# Patient Record
Sex: FEMALE | Race: WHITE | Employment: OTHER | URBAN - METROPOLITAN AREA
[De-identification: names, ages, dates, MRNs, and addresses within clinical notes are randomized per-mention and may not be internally consistent; named-entity substitution may affect disease eponyms.]

---

## 2023-05-18 ENCOUNTER — HOSPITAL ENCOUNTER (OUTPATIENT)
Dept: PHYSICAL THERAPY | Facility: CLINIC | Age: 73
Setting detail: THERAPIES SERIES
Discharge: HOME | End: 2023-05-18
Payer: MEDICARE

## 2023-05-18 PROCEDURE — 97161 PT EVAL LOW COMPLEX 20 MIN: CPT

## 2023-05-18 PROCEDURE — 95992 CANALITH REPOSITIONING PROC: CPT

## 2023-05-18 PROCEDURE — 97110 THERAPEUTIC EXERCISES: CPT

## 2023-05-18 NOTE — CONSULTS
[facility] &  Therapy  [address]  [phone]  F: [phone] [] [address] [facility] [address]   [address]  P: [phone]  F: [phone] [] [address] &  Therapy  [address]  P: [phone]  F: [phone] [x] [address]  P: [phone]  F: [phone] [] [address]  [facility]   [address]   [place]: [phone]  F: [phone]       Physical Therapy Vestibular Rehab Evaluation    Date:  2023  Patient: Galo Hernandez  : 1950  MRN: [id number]  Physician: Charissa Nova MD   Insurance: Medicare- [facility]  Medical Diagnosis: R42 (ICD-10-CM) - Dizziness and giddiness    Rehab Codes: R42 (ICD-10-CM) - Dizziness and giddiness  H81.10  Onset date: 23; see below  Next Dr's appt.: TBD- pending success with PT services     Subjective:   CC: Began to experience x4-5 years ago- \"out of the blue- but then wouldn't last.\"   X2 years appeared cyclical.   Last Z8.6 years- no episodes. \"3-4 times in the past 5 years. \"  Last episode occurred during fitness routine- rolling to place/pick-up barbell. Reports sig room spinning dizziness with attempting transfer from floor. \"Not right yet- not major- can do laundry, cook etc.\"   Has been unable to participate in fitness for past month and a half. Symptoms also with bending forward. Also experiencing symptoms with leaning, looking to the left. Visited chiro- underwent epley- felt worse. Reports attempting home epley- no relief. Visited ENT- nothing significant. Not currently utilizing Meclizine.      HPI: 23; see above     Comorbidities: NONE  [] Obesity [] Dialysis  [] Other:   [] Asthma/COPD [] Dementia [] Other:   [] Stroke [] Sleep

## 2023-05-18 NOTE — FLOWSHEET NOTE
Adri Fall Risk Assessment    Patient Name:  Chito Manzanares  : 1950    Risk Factor Scale  Score   History of Falls [] Yes  [x] No 25  0 0   Secondary Diagnosis [] Yes  [x] No 15  0 0   Ambulatory Aid [] Furniture  [] Crutches/cane/walker  [x] None/bedrest/wheelchair/nurse 30  15  0 0   IV/Heparin Lock [] Yes  [x] No 20  0 0   Gait/Transferring [] Impaired  [] Weak  [x] Normal/bedrest/immobile 20  10  0 0   Mental Status [] Forgets limitations  [x] Oriented to own ability 15  0 0      Total: 0     Based on the Assessment score: check the appropriate box.     [x]  No intervention needed   Low =   Score of 0-24    []  Use standard prevention interventions Moderate =  Score of 24-44   [] Give patient handout and discuss fall prevention strategies   [] Establish goal of education for patient/family RE: fall prevention strategies    []  Use high risk prevention interventions High = Score of 45 and higher   [] Give patient handout and discuss fall prevention strategies   [] Establish goal of education for patient/family Re: fall prevention strategies   [] Discuss lifeline / other resources    Electronically signed by:   French Morelos PT  Date: 2023

## 2023-05-25 ENCOUNTER — HOSPITAL ENCOUNTER (OUTPATIENT)
Dept: PHYSICAL THERAPY | Facility: CLINIC | Age: 73
Setting detail: THERAPIES SERIES
Discharge: HOME | End: 2023-05-25
Payer: MEDICARE

## 2023-05-25 PROCEDURE — 97530 THERAPEUTIC ACTIVITIES: CPT

## 2023-05-25 PROCEDURE — 97035 APP MDLTY 1+ULTRASOUND EA 15: CPT

## 2023-05-25 PROCEDURE — 97110 THERAPEUTIC EXERCISES: CPT

## 2023-05-25 NOTE — FLOWSHEET NOTE
Verbalizes understanding. [] Demonstrates understanding. [] Needs review. [] Demonstrates/verbalizes HEP/Ed previously given. 5/25/23- See grid above for details. Plan: [x] Continue current frequency toward long and short term goals. [x] Specific Instructions for subsequent treatments: PT services placed on hold for x1 month- may return if symptoms resume or change- otherwise will be discharged.        Time In: 1:01PM            Time Out: 1:25PM    Electronically signed by:  Eva Dougherty PT

## 2023-07-06 ENCOUNTER — CLINICAL DOCUMENTATION (OUTPATIENT)
Dept: PHYSICAL THERAPY | Facility: CLINIC | Age: 73
End: 2023-07-06

## 2023-07-06 NOTE — DISCHARGE SUMMARY
[] [facility] - [facility] TWELVESTEP [facility] &  Therapy  [address]  [phone]  F: [phone] [] [address] [facility]  [address]   [address]  P: [phone]  F: [phone] [] [address] &  Therapy  [address]  P: [phone]  F: [phone] [x] [facility]  P: [phone]  F: [phone] [] [address] [facility]  [address] [facility]   [address]   [place]: [phone]  F: [phone]      Physical Therapy Discharge Note    Date: 2023      Patient: Zelalem Moy  : 1950  MRN: [id number]    Physician: Loki Pierre MD                              Insurance: [place]- [place]  Medical Diagnosis: R42 (ICD-10-CM) - Dizziness and giddiness                 Rehab Codes: R42 (ICD-10-CM) - Dizziness and giddiness  H81.10  Onset date: 23; see below                   Next Dr's appt.: TBD- pending success with PT services   Visit# / total visits: ; Progress note for Medicare patient due at visit 6 [or end of PT POC]                Cancels/No Shows: 0/0  Date of initial visit: 23                Date of final visit: 23    Assessment:  Pt discharged today from PT services secondary to not returning- most likely due to feeling better. No further PT services recommended at this time. See below for goal update. [facility] [place] is 4  [facility] is 100%    Problems:                                                                                     [x]  1. Vertigo- intermittent   []  2. Difficulty walking a straight course                                    [x]  3. Positive Yoana Hallpike                                               []  4.  Static balance deficit: mCTSIB  []  5.   Dynamic balance deficit: Marcus Balance Scale (BBS), Dynamic Gait Index (DGI),

## 2025-05-15 ENCOUNTER — HOSPITAL ENCOUNTER (OUTPATIENT)
Dept: PHYSICAL THERAPY | Age: 75
Setting detail: THERAPIES SERIES
Discharge: HOME | End: 2025-05-15
Payer: MEDICARE

## 2025-05-15 PROCEDURE — 97530 THERAPEUTIC ACTIVITIES: CPT

## 2025-05-15 PROCEDURE — 95992 CANALITH REPOSITIONING PROC: CPT

## 2025-05-15 PROCEDURE — 97161 PT EVAL LOW COMPLEX 20 MIN: CPT

## 2025-05-15 NOTE — CONSULTS
[facility] Outpatient Rehabilitation & Therapy  [address] [place] [address]    P: [phone]  F: [phone]     PHYSICAL THERAPY VESTIBULAR EVALUATION      Date:  5/15/2025  Patient: Faith Mondragon  : 1950  MRN: [id number]  Referring Provider:  Artemio Carney MD  Insurance: [facility] Medicare Advantage no auth, unlimited  (medical necessity)   Medical Diagnosis: R42 Vertigo  Rehab Codes: H81.10, M54.2    Onset date: 25    Next 's appt.: TBD, PRN     Subjective:   HPI: History of vertigo 2 years ago. Had PT with good result after 1 session. Reports the vertigo returned a few months ago. Its annoying to her but not debilitating. Not as intense as a couple years ago, episode. Reports some left side neck pain as well x 1.5 weeks that is not getting better. Reports room spinning dizziness that lasts seconds when laying down and getting up. Has to move slow with change in body positions. Bending over makes her dizzy. She is very active and exercises 6 days a week. Says this is limiting her weight training that requires  her to lay flat. Dizziness is limiting her workouts. Reports 1 fall on 25 - visting her daugther in [place] when she got up in the middle of the night to use bathroom and had a visual aura of yellow Atka and ringing in the ears x 10\" when she got up to leave bathroom she blacked out and fell to the floor. Nothing else came of it, did not see doctor following this.      CC:  vertigo       PMHx:    No past medical history on file.    Meningioma removed in thoracic region 10 years ago at [facility].        Allergies: [] NKA  [x] Refer to intake sheet  Medication: [x] Refer to intake sheet  [] None  Test: [] X-Ray  [] MRI [] CT Scan   [] Other  Comments:     Pain:   Neck pain  0/10 , hurts when presses on it.  [] No c/o pain    Pain altered Tx: [] No  [] Yes Action:  Symptoms: [] Improving  [] Worsening  [] Same  Sleep: [x] OK  [] Disturbed    Fall

## 2025-05-21 ENCOUNTER — HOSPITAL ENCOUNTER (OUTPATIENT)
Dept: PHYSICAL THERAPY | Age: 75
Setting detail: THERAPIES SERIES
Discharge: HOME | End: 2025-05-21
Payer: MEDICARE

## 2025-05-21 PROCEDURE — 97530 THERAPEUTIC ACTIVITIES: CPT

## 2025-05-21 PROCEDURE — 97110 THERAPEUTIC EXERCISES: CPT

## 2025-05-21 PROCEDURE — 97140 MANUAL THERAPY 1/> REGIONS: CPT

## 2025-05-21 NOTE — FLOWSHEET NOTE
[facility]   Outpatient Rehabilitation & Therapy  [address] [facility] [address]  P: [phone]   F: [phone]    Physical Therapy Daily Treatment Note      Date:  2025  Patient Name:  Faith Mondragon    :  1950  MRN: [id number]  Referring Provider:   Artemio Carney MD    Insurance: [facility] Medicare Advantage no auth, unlimited  (medical necessity)   Medical Diagnosis: R42 Vertigo  Rehab Codes: H81.10, M54.2    Onset date: 25                   Next 's appt.: TBD, PRN   Visit# / total visits: 2/8  Cancels/No Shows: 0/0    Subjective:  arrives stating she has felt very \"crappy\" since last session. States that she has had a lot of pain in her neck/base of skull. Has not had the dizziness like she did before but has not done much to test it.   Pain:  [x] Yes  [] No Location:  neck/ base of skull Pain Rating: (0-10 scale) 2/10  Pain altered Tx:  [] No  [] Yes  Action:  Comments:    Objective:      Precautions:  thoracic sx- meningioma removal 10 years ago.   Exercises:INSTRUCTION IN HOME EXERCISE PROGRAM  Exercise Reps/ Time Weight/ Level Comments    right Dixhallpike - negative          Left dixhallpike- negative          Right/left roll test- negative                   Supine chin tuck  X10 5\" hold        Cervical rotation stretch with towel 5x each way, 5\" Hold        Cervical extension with towel X 5 5\" hold      Other:  Manual: SOR, TPR to spenius capitis muscles. Upper cervical rotation mobilization, x 15' total.     Access Code: [email or web address]  URL: [email or web address]  Date: 2025  Prepared by: Manjit Frank    Exercises  - Supine Chin Tuck  - 3 x daily - 7 x weekly - 1 sets - 10 reps - 5\" hold  - Seated Assisted Cervical Rotation with Towel  - 3 x daily - 7 x weekly - 1 sets - 5 reps - 5\" hold  - Upper Cervical Extension SNAG with Strap  - 3 x daily - 7 x weekly - 1 sets - 10 reps - 5\" hold       Specific Instructions for next treatment: focus on upper cervical spine.

## 2025-05-22 ENCOUNTER — APPOINTMENT (OUTPATIENT)
Dept: PHYSICAL THERAPY | Age: 75
End: 2025-05-22
Payer: MEDICARE

## 2025-05-28 ENCOUNTER — APPOINTMENT (OUTPATIENT)
Dept: PHYSICAL THERAPY | Age: 75
End: 2025-05-28
Payer: MEDICARE

## 2025-05-30 ENCOUNTER — HOSPITAL ENCOUNTER (OUTPATIENT)
Dept: PHYSICAL THERAPY | Age: 75
Setting detail: THERAPIES SERIES
Discharge: HOME | End: 2025-05-30
Payer: MEDICARE

## 2025-05-30 PROCEDURE — 97140 MANUAL THERAPY 1/> REGIONS: CPT

## 2025-05-30 PROCEDURE — 97110 THERAPEUTIC EXERCISES: CPT

## 2025-05-30 NOTE — FLOWSHEET NOTE
[facility]   Outpatient Rehabilitation & Therapy  [address]  P: [phone]   F: [phone]    Physical Therapy Daily Treatment Note      Date:  2025  Patient Name:  Faith Mondragon    :  1950  MRN: [id number]  Referring Provider:   Artemio Carney MD    Insurance: [facility] Medicare Advantage no auth, unlimited  (medical necessity)   Medical Diagnosis: R42 Vertigo  Rehab Codes: H81.10, M54.2    Onset date: 25                   Next 's appt.: TBD, PRN   Visit# / total visits: 3/8  Cancels/No Shows: 0/0    Subjective:  arrives stating she still feels off. Saw her Chiropractor a few times since last session. They did a test that looks at the inflammation in the spine and she reports her neck was \"off the charts\" she doesn't feel the vertigo is an issue anymore and that its more her neck. She thinks she is moving in the right direction. Reports she did her HEP some each day. Reports she is more anxious than her normal.       Pain:  [] Yes  [x] No Location:  neck/ base of skull Pain Rating: (0-10 scale) 0/10 \" my head just doesn't feel right\"   Pain altered Tx:  [] No  [] Yes  Action:  Comments:    Objective:      Precautions:  thoracic sx- meningioma removal 10 years ago.   Exercises:INSTRUCTION IN HOME EXERCISE PROGRAM  Exercise Reps/ Time Weight/ Level Comments   Neckslevel      Cervical rotation  X 5 each direction       chin tuck  X10 5\" hold   yellow      Prone middle trapezius- T  X 10 Active     Seated capital flexion stretch 3x30\"      Cervical rotation stretch with towel 5x each way, 5\" Hold              Cervical extension with towel X 5 5\" hold      Other:  Manual: SOR, TPR to spenius capitis muscles. Capital insertion mobilization (SB, rotate, cervical retraction) , x 15' total.     Access Code: [email or web address]  URL: [email or web address]  Date: 2025  Prepared by: Manjit Frank    Exercises  - Supine Chin Tuck  - 3 x daily - 7 x weekly - 1 sets - 10 reps

## 2025-06-03 ENCOUNTER — HOSPITAL ENCOUNTER (OUTPATIENT)
Dept: PHYSICAL THERAPY | Age: 75
Setting detail: THERAPIES SERIES
Discharge: HOME | End: 2025-06-03
Payer: MEDICARE

## 2025-06-03 PROCEDURE — 97110 THERAPEUTIC EXERCISES: CPT

## 2025-06-03 PROCEDURE — 97140 MANUAL THERAPY 1/> REGIONS: CPT

## 2025-06-03 NOTE — FLOWSHEET NOTE
[facility]   Outpatient Rehabilitation & Therapy  [address]  P: [phone]   F: [phone]    Physical Therapy Daily Treatment Note      Date:  6/3/2025  Patient Name:  Faith Mondragon    :  1950  MRN: [id number]  Referring Provider:   Artemio Carney MD    Insurance: [facility] Medicare Advantage no auth, unlimited  (medical necessity)   Medical Diagnosis: R42 Vertigo  Rehab Codes: H81.10, M54.2    Onset date: 25                   Next 's appt.: TBD, PRN   Visit# / total visits:   Cancels/No Shows: 0/0    Subjective:  arrives that she notices improvement. The symptoms are not as bad and is not as debilitating. She has resumed some of her exercises slowly.  Still feels the tightness is still at based of skull. Has been doing the stretches.       Pain:  [] Yes  [x] No Location:  neck/ base of skull Pain Rating: (0-10 scale) 0/10  Pain altered Tx:  [] No  [] Yes  Action:  Comments:    Objective:      Precautions:  thoracic sx- meningioma removal 10 years ago.   Exercises:INSTRUCTION IN HOME EXERCISE PROGRAM  Exercise Reps/ Time Weight/ Level Comments   Neckslevel      Cervical rotation  X 10 each direction  yellow    Cervical SB  X 10 each Active      chin tuck  X10 5\" hold   yellow      Prone middle trapezius- T  X 10 Active     Seated capital flexion stretch 3x30\"      Cervical rotation stretch with towel 5x each way, 5\" Hold              Cervical extension with towel X 5 5\" hold      Other:  Manual: SOR, TPR to spenius capitis muscles. Capital insertion mobilization (SB, rotate, cervical retraction) , x 15' total.     Access Code: [email or web address]  URL: [email or web address]  Date: 2025  Prepared by: Manjit Frank    Exercises  - Supine Chin Tuck  - 3 x daily - 7 x weekly - 1 sets - 10 reps - 5\" hold  - Seated Assisted Cervical Rotation with Towel  - 3 x daily - 7 x weekly - 1 sets - 5 reps - 5\" hold  - Upper Cervical Extension SNAG with Strap  - 3 x daily - 7 x

## 2025-06-05 ENCOUNTER — HOSPITAL ENCOUNTER (OUTPATIENT)
Dept: PHYSICAL THERAPY | Age: 75
Setting detail: THERAPIES SERIES
Discharge: HOME | End: 2025-06-05
Payer: MEDICARE

## 2025-06-05 PROCEDURE — 97110 THERAPEUTIC EXERCISES: CPT

## 2025-06-05 PROCEDURE — 97140 MANUAL THERAPY 1/> REGIONS: CPT

## 2025-06-05 NOTE — FLOWSHEET NOTE
[facility]   Outpatient Rehabilitation & Therapy  [address]  P: [phone]   F: [phone]    Physical Therapy Daily Treatment Note      Date:  2025  Patient Name:  Faith Mondragon    :  1950  MRN: [id number]  Referring Provider:   Artemio Carney MD    Insurance: [facility] Medicare Advantage no auth, unlimited  (medical necessity)   Medical Diagnosis: R42 Vertigo  Rehab Codes: H81.10, M54.2    Onset date: 25                   Next 's appt.: TBD, PRN   Visit# / total visits:   Cancels/No Shows: 0/0    Subjective:  Arrives today that she continues to improve. Went to chiropractor Tuesday and did a workout after without any problems. Arrives without pain.       Pain:  [] Yes  [x] No Location:  neck/ base of skull Pain Rating: (0-10 scale) 0/10  Pain altered Tx:  [] No  [] Yes  Action:  Comments:    Objective:      Precautions:  thoracic sx- meningioma removal 10 years ago.   Exercises:INSTRUCTION IN HOME EXERCISE PROGRAM  Exercise Reps/ Time Weight/ Level Comments   Neckslevel      Cervical rotation  X 10 each direction  yellow    Cervical SB  X 10 each Active      chin tuck  2X10 5\" hold   yellow           Prone cervical retraction 2x10 active    Prone W scapular retraction 2x10  Active     Prone middle trapezius- T  2X 10 Active     Seated capital flexion stretch 3x30\"      Cervical rotation stretch with towel 5x each way, 5\" Hold              Cervical extension with towel X 5 5\" hold      Other:  Manual: SOR, TPR to spenius capitis muscles. Capital insertion mobilization (SB, rotate, cervical retraction) , x 10' total.     Access Code: [email or web address]  URL: [email or web address]  Date: 2025  Prepared by: Manjit Frank    Exercises  - Supine Chin Tuck  - 3 x daily - 7 x weekly - 1 sets - 10 reps - 5\" hold  - Seated Assisted Cervical Rotation with Towel  - 3 x daily - 7 x weekly - 1 sets - 5 reps - 5\" hold  - Upper Cervical Extension SNAG with Strap  - 3 x

## 2025-06-10 ENCOUNTER — APPOINTMENT (OUTPATIENT)
Dept: PHYSICAL THERAPY | Age: 75
End: 2025-06-10
Payer: MEDICARE

## 2025-06-12 ENCOUNTER — HOSPITAL ENCOUNTER (OUTPATIENT)
Dept: PHYSICAL THERAPY | Age: 75
Setting detail: THERAPIES SERIES
Discharge: HOME | End: 2025-06-12
Payer: MEDICARE

## 2025-06-12 PROCEDURE — 97110 THERAPEUTIC EXERCISES: CPT

## 2025-06-12 PROCEDURE — 97140 MANUAL THERAPY 1/> REGIONS: CPT

## 2025-06-12 NOTE — FLOWSHEET NOTE
Activities      []  Aquatics      []  Neuromuscular      [] Vasocompression      [] Gait Training      [] Dry needling        [] 1 or 2 muscles        [] 3 or more muscles      []  Other      Total Billable time 42 3      Time In: 0848         Time Out: 930    Electronically signed by:  Manjit Frank PT

## 2025-06-17 ENCOUNTER — APPOINTMENT (OUTPATIENT)
Dept: PHYSICAL THERAPY | Age: 75
End: 2025-06-17
Payer: MEDICARE

## 2025-06-19 ENCOUNTER — APPOINTMENT (OUTPATIENT)
Dept: PHYSICAL THERAPY | Age: 75
End: 2025-06-19
Payer: MEDICARE